# Patient Record
Sex: MALE | Race: OTHER | Employment: FULL TIME | URBAN - METROPOLITAN AREA
[De-identification: names, ages, dates, MRNs, and addresses within clinical notes are randomized per-mention and may not be internally consistent; named-entity substitution may affect disease eponyms.]

---

## 2018-07-31 ENCOUNTER — APPOINTMENT (OUTPATIENT)
Dept: GENERAL RADIOLOGY | Age: 28
End: 2018-07-31
Payer: COMMERCIAL

## 2018-07-31 ENCOUNTER — HOSPITAL ENCOUNTER (EMERGENCY)
Age: 28
Discharge: HOME | End: 2018-07-31
Attending: EMERGENCY MEDICINE
Payer: COMMERCIAL

## 2018-07-31 VITALS
OXYGEN SATURATION: 100 % | HEART RATE: 64 BPM | SYSTOLIC BLOOD PRESSURE: 156 MMHG | DIASTOLIC BLOOD PRESSURE: 82 MMHG | TEMPERATURE: 98.2 F | BODY MASS INDEX: 25.76 KG/M2 | HEIGHT: 68 IN | RESPIRATION RATE: 18 BRPM | WEIGHT: 170 LBS

## 2018-07-31 DIAGNOSIS — M25.561 ACUTE PAIN OF RIGHT KNEE: Primary | ICD-10-CM

## 2018-07-31 PROCEDURE — 99283 EMERGENCY DEPT VISIT LOW MDM: CPT

## 2018-07-31 PROCEDURE — 73562 X-RAY EXAM OF KNEE 3: CPT

## 2018-07-31 RX ORDER — IBUPROFEN 600 MG/1
600 TABLET ORAL EVERY 6 HOURS PRN
Qty: 30 TABLET | Refills: 0 | Status: SHIPPED | OUTPATIENT
Start: 2018-07-31

## 2018-07-31 ASSESSMENT — PAIN SCALES - GENERAL: PAINLEVEL_OUTOF10: 5

## 2018-07-31 ASSESSMENT — PAIN DESCRIPTION - PAIN TYPE: TYPE: ACUTE PAIN

## 2018-07-31 NOTE — ED NOTES
Pt to room 12 via wheelchair. Pt c/o right knee pain and swelling after he states that he twisted it yesterday while doing a drill. Pt states that he was able to walk yesterday without difficulty however woke up this am and reports pain and swelling in the right knee. Pt unable to put any weight on the right leg. No obvious deformity is noted, PMS intact. Pt states pain increases when trying to stand on right leg, extension and flexion of right leg. No pain when at rest. Pt had taken tylenol and motrin yesterday as well as iced the knee. He reports that the Tylenol and ice helped yesterday.  Pt has not taken anything for pain today     Opal Chavarria RN  07/31/18 [id number]

## 2018-07-31 NOTE — ED PROVIDER NOTES
well-developed and well-nourished. HENT:   Head: Normocephalic and atraumatic. Eyes: Conjunctivae are normal.   Neck: Normal range of motion. Neck supple. Musculoskeletal: Normal range of motion. Patient describes some pain to the internal aspect of the right knee. I could not appreciate any gross laxity of the knee, full range of motion with good pulses motor sensation of the right lower extremity   Neurological: He is alert. No focal deficits are appreciated   Skin: Skin is warm and dry. No rash noted. Psychiatric: He has a normal mood and affect. Nursing note and vitals reviewed. DIFFERENTIAL DIAGNOSIS/ MDM:     I will get an xray of the right knee    DIAGNOSTIC RESULTS         RADIOLOGY:   Non-plain film images such as CT, Ultrasound and MRI are read by the radiologist. Plain radiographic images are visualized and the radiologist interpretations are reviewed as follows:         Interpretation per the Radiologist below, if available at the time of this note:    XR KNEE RIGHT (3 VIEWS) (Final result)   Result time 07/31/18 09:36:28   Final result by Gayatri Monterroso MD (07/31/18 09:36:28)                Impression:    Mild to moderate joint effusion, otherwise unremarkable right knee. Narrative:    EXAMINATION:  3 XRAY VIEWS OF THE RIGHT KNEE    7/31/2018 9:06 am    COMPARISON:  None. HISTORY:    Ordering Physician Provided Reason for Exam: pain and swelling to lateral  side of knee.  unable to straighten or bear weight  Acuity: Acute  Type of Exam: Initial  Mechanism of Injury: pt fell yesterday    FINDINGS:  No evidence of acute fracture or dislocation.  No focal osseous lesion.  Mild  to moderate joint effusion. No focal soft tissue abnormality.                      LABS:  No results found for this visit on 07/31/18.         EMERGENCY DEPARTMENT COURSE:   Vitals:    Vitals:    07/31/18 0844   BP: (!) 156/82   Pulse: 64   Resp: 18   Temp: 98.2 °F (36.8 °C)   TempSrc: Oral   SpO2: 100%   Weight: 77.1 kg (170 lb)   Height: 5' 8\" (1.727 m)     -------------------------  BP: (!) 156/82, Temp: 98.2 °F (36.8 °C), Pulse: 64, Resp: 18      RE-EVALUATION:  X-ray showed a joint effusion. Otherwise no other acute process the patient is neurovascularly intact. Given this, I do feel able to be followed up as an outpatient. We will Ace wrap the knee and give the patient crutches. I'm recommending the Ace wrap and crutches for ambulating and support. Rest ice elevation as possible. Return to the ER for increasing pain numbness weakness or other concerns otherwise to follow-up with her occupational medicine division within the next few days  At this time the patient is without objective evidence of an acute process requiring hospitalization or inpatient management. They have remained hemodynamically stable throughout their entire ED visit and are stable for discharge with outpatient follow-up. The plan has been discussed in detail and they are aware of the specific conditions for emergent return, as well as the importance of follow-up    I have reviewed the disposition diagnosis with the patient and or their family/guardian. I have answered their questions and given discharge instructions. They voiced understanding of these instructions and did not have any further questions or complaints. PROCEDURES:  None    FINAL IMPRESSION      1.  Acute pain of right knee          DISPOSITION/PLAN   DISPOSITION Decision To Discharge 07/31/2018 09:47:08 AM      CONDITION ON DISPOSITION:   Stable    PATIENT REFERRED TO:    Occupational Medicine  Call in 1 day        DISCHARGE MEDICATIONS:  New Prescriptions    IBUPROFEN (ADVIL;MOTRIN) 600 MG TABLET    Take 1 tablet by mouth every 6 hours as needed for Pain       (Please note that portions of this note were completed with a voice recognition program.  Efforts were made to edit the dictations but occasionally words are mis-transcribed.)    Martina Stoll